# Patient Record
Sex: FEMALE | Race: WHITE | NOT HISPANIC OR LATINO | URBAN - METROPOLITAN AREA
[De-identification: names, ages, dates, MRNs, and addresses within clinical notes are randomized per-mention and may not be internally consistent; named-entity substitution may affect disease eponyms.]

---

## 2023-03-10 LAB — THYROTROPIN (MIU/L) IN SER/PLAS BY DETECTION LIMIT <= 0.05 MIU/L: 0.8 MIU/L (ref 0.44–3.98)

## 2023-03-22 DIAGNOSIS — E03.9 HYPOTHYROIDISM, UNSPECIFIED: ICD-10-CM

## 2023-03-22 DIAGNOSIS — E03.9 HYPOTHYROIDISM, UNSPECIFIED TYPE: Primary | ICD-10-CM

## 2023-03-22 RX ORDER — LEVOTHYROXINE SODIUM 100 UG/1
TABLET ORAL
Qty: 90 TABLET | Refills: 3 | Status: SHIPPED | OUTPATIENT
Start: 2023-03-22 | End: 2023-11-15 | Stop reason: SDUPTHER

## 2023-03-22 RX ORDER — ALBUTEROL SULFATE 90 UG/1
2 AEROSOL, METERED RESPIRATORY (INHALATION) EVERY 4 HOURS PRN
COMMUNITY
Start: 2021-04-05 | End: 2023-11-15 | Stop reason: SDUPTHER

## 2023-03-22 RX ORDER — LEVOTHYROXINE SODIUM 100 UG/1
100 TABLET ORAL
COMMUNITY
Start: 2022-12-29 | End: 2023-03-22 | Stop reason: SDUPTHER

## 2023-03-22 RX ORDER — SERTRALINE HYDROCHLORIDE 50 MG/1
50 TABLET, FILM COATED ORAL
COMMUNITY
Start: 2022-07-18 | End: 2023-11-15

## 2023-03-22 RX ORDER — LORATADINE 10 MG/1
1 TABLET ORAL DAILY
COMMUNITY
Start: 2020-10-02

## 2023-03-22 RX ORDER — LEVOTHYROXINE SODIUM 100 UG/1
100 TABLET ORAL
Qty: 90 TABLET | Refills: 3 | Status: SHIPPED | OUTPATIENT
Start: 2023-03-22 | End: 2024-02-24 | Stop reason: SDUPTHER

## 2023-04-03 ENCOUNTER — TELEPHONE (OUTPATIENT)
Dept: PRIMARY CARE | Facility: CLINIC | Age: 58
End: 2023-04-03
Payer: COMMERCIAL

## 2023-04-03 NOTE — TELEPHONE ENCOUNTER
----- Message from ALBINO Aldrich sent at 4/2/2023 11:10 PM EDT -----  Please let pt know that the thyroid labs look good.  Continue with current dose (100 mcg)  Does she need refills?  Please verify pharmacy

## 2023-04-03 NOTE — TELEPHONE ENCOUNTER
Jacob per HIPAA to leave a detailed message.  Left voicemail relaying message from provider  Requested patient call back if she needed refills and verify her pharmacy

## 2023-04-03 NOTE — RESULT ENCOUNTER NOTE
Please let pt know that the thyroid labs look good.  Continue with current dose (100 mcg)  Does she need refills?  Please verify pharmacy

## 2023-11-15 ENCOUNTER — OFFICE VISIT (OUTPATIENT)
Dept: PRIMARY CARE | Facility: CLINIC | Age: 58
End: 2023-11-15
Payer: COMMERCIAL

## 2023-11-15 VITALS
TEMPERATURE: 97.9 F | DIASTOLIC BLOOD PRESSURE: 100 MMHG | SYSTOLIC BLOOD PRESSURE: 142 MMHG | WEIGHT: 187.2 LBS | BODY MASS INDEX: 33.69 KG/M2

## 2023-11-15 DIAGNOSIS — R73.09 ELEVATED GLUCOSE: ICD-10-CM

## 2023-11-15 DIAGNOSIS — F32.A ANXIETY AND DEPRESSION: ICD-10-CM

## 2023-11-15 DIAGNOSIS — J45.21 MILD INTERMITTENT ASTHMA WITH EXACERBATION (HHS-HCC): ICD-10-CM

## 2023-11-15 DIAGNOSIS — F41.9 ANXIETY AND DEPRESSION: ICD-10-CM

## 2023-11-15 DIAGNOSIS — E03.9 ACQUIRED HYPOTHYROIDISM: Primary | ICD-10-CM

## 2023-11-15 PROBLEM — J45.909 ASTHMA (HHS-HCC): Status: ACTIVE | Noted: 2023-11-15

## 2023-11-15 PROBLEM — J30.2 SEASONAL ALLERGIES: Status: ACTIVE | Noted: 2023-11-15

## 2023-11-15 PROBLEM — R79.89 ELEVATED LIVER FUNCTION TESTS: Status: ACTIVE | Noted: 2023-11-15

## 2023-11-15 PROCEDURE — 1036F TOBACCO NON-USER: CPT | Performed by: FAMILY MEDICINE

## 2023-11-15 PROCEDURE — 99214 OFFICE O/P EST MOD 30 MIN: CPT | Performed by: FAMILY MEDICINE

## 2023-11-15 RX ORDER — ALBUTEROL SULFATE 90 UG/1
2 AEROSOL, METERED RESPIRATORY (INHALATION) EVERY 4 HOURS PRN
Qty: 18 G | Refills: 1 | Status: SHIPPED | OUTPATIENT
Start: 2023-11-15

## 2023-11-15 RX ORDER — MELOXICAM 15 MG/1
15 TABLET ORAL DAILY
COMMUNITY
End: 2024-03-28 | Stop reason: WASHOUT

## 2023-11-15 RX ORDER — PREDNISONE 20 MG/1
40 TABLET ORAL DAILY
Qty: 10 TABLET | Refills: 0 | Status: SHIPPED | OUTPATIENT
Start: 2023-11-15 | End: 2023-11-20

## 2023-11-15 RX ORDER — SERTRALINE HYDROCHLORIDE 50 MG/1
50 TABLET, FILM COATED ORAL DAILY
Qty: 30 TABLET | Refills: 1 | Status: SHIPPED | OUTPATIENT
Start: 2023-11-15 | End: 2023-12-08

## 2023-11-15 ASSESSMENT — ANXIETY QUESTIONNAIRES
7. FEELING AFRAID AS IF SOMETHING AWFUL MIGHT HAPPEN: SEVERAL DAYS
6. BECOMING EASILY ANNOYED OR IRRITABLE: MORE THAN HALF THE DAYS
4. TROUBLE RELAXING: SEVERAL DAYS
1. FEELING NERVOUS, ANXIOUS, OR ON EDGE: MORE THAN HALF THE DAYS
5. BEING SO RESTLESS THAT IT IS HARD TO SIT STILL: NOT AT ALL
3. WORRYING TOO MUCH ABOUT DIFFERENT THINGS: MORE THAN HALF THE DAYS
2. NOT BEING ABLE TO STOP OR CONTROL WORRYING: MORE THAN HALF THE DAYS
IF YOU CHECKED OFF ANY PROBLEMS ON THIS QUESTIONNAIRE, HOW DIFFICULT HAVE THESE PROBLEMS MADE IT FOR YOU TO DO YOUR WORK, TAKE CARE OF THINGS AT HOME, OR GET ALONG WITH OTHER PEOPLE: SOMEWHAT DIFFICULT
GAD7 TOTAL SCORE: 10

## 2023-11-15 ASSESSMENT — PATIENT HEALTH QUESTIONNAIRE - PHQ9
3. TROUBLE FALLING OR STAYING ASLEEP OR SLEEPING TOO MUCH: MORE THAN HALF THE DAYS
SUM OF ALL RESPONSES TO PHQ QUESTIONS 1-9: 13
7. TROUBLE CONCENTRATING ON THINGS, SUCH AS READING THE NEWSPAPER OR WATCHING TELEVISION: MORE THAN HALF THE DAYS
1. LITTLE INTEREST OR PLEASURE IN DOING THINGS: MORE THAN HALF THE DAYS
8. MOVING OR SPEAKING SO SLOWLY THAT OTHER PEOPLE COULD HAVE NOTICED. OR THE OPPOSITE, BEING SO FIGETY OR RESTLESS THAT YOU HAVE BEEN MOVING AROUND A LOT MORE THAN USUAL: NOT AT ALL
9. THOUGHTS THAT YOU WOULD BE BETTER OFF DEAD, OR OF HURTING YOURSELF: SEVERAL DAYS
10. IF YOU CHECKED OFF ANY PROBLEMS, HOW DIFFICULT HAVE THESE PROBLEMS MADE IT FOR YOU TO DO YOUR WORK, TAKE CARE OF THINGS AT HOME, OR GET ALONG WITH OTHER PEOPLE: SOMEWHAT DIFFICULT
6. FEELING BAD ABOUT YOURSELF - OR THAT YOU ARE A FAILURE OR HAVE LET YOURSELF OR YOUR FAMILY DOWN: NEARLY EVERY DAY
5. POOR APPETITE OR OVEREATING: NOT AT ALL
2. FEELING DOWN, DEPRESSED OR HOPELESS: MORE THAN HALF THE DAYS
4. FEELING TIRED OR HAVING LITTLE ENERGY: SEVERAL DAYS
SUM OF ALL RESPONSES TO PHQ9 QUESTIONS 1 AND 2: 4

## 2023-11-15 NOTE — PROGRESS NOTES
Subjective   Patient ID: Peace Santana is a 58 y.o. female who presents for Depression and Follow-up.    HPI   Cough and Congestion x 2 + weeks   No otcs meds now but did try muceneix   Fevers and fatigue at the begining   Still coughing quite a bit.  No chest pain, shortness of breath    Depression has been bad   Buspar didn't help much   Rxed zoloft   Wondering about other options  Lack of motivation  Some suicidal ideation but no plans  No HI  No hallucinations or delusions    Review of Systems  10 point review of systems negative except for what is noted in HPI  Objective   BP (!) 142/100   Temp 36.6 °C (97.9 °F)   Wt 84.9 kg (187 lb 3.2 oz)   BMI 33.69 kg/m²     Physical Exam  Vitals and nursing note reviewed.   Constitutional:       Appearance: Normal appearance. She is normal weight.   HENT:      Head: Normocephalic and atraumatic.      Right Ear: External ear normal.      Left Ear: External ear normal.      Mouth/Throat:      Mouth: Mucous membranes are moist.   Eyes:      Conjunctiva/sclera: Conjunctivae normal.   Cardiovascular:      Rate and Rhythm: Normal rate and regular rhythm.      Heart sounds: Normal heart sounds.   Pulmonary:      Effort: Pulmonary effort is normal.      Breath sounds: Rhonchi (Diffuse) present.   Musculoskeletal:         General: No swelling.      Cervical back: Normal range of motion and neck supple.   Skin:     General: Skin is warm and dry.      Capillary Refill: Capillary refill takes less than 2 seconds.   Neurological:      General: No focal deficit present.      Mental Status: She is alert. Mental status is at baseline.   Psychiatric:         Mood and Affect: Mood is depressed.         Speech: Speech normal.         Behavior: Behavior normal.         Thought Content: Thought content normal.         Judgment: Judgment normal.         Assessment/Plan   1. Acquired hypothyroidism  Update labs.  Follow-up tbd  - TSH with reflex to Free T4 if abnormal; Future    2. Anxiety and  depression  Discussed starting Zoloft and getting in to see counseling.  We will plan to see her back in 1 to 2 months unless concerns sooner  - sertraline (Zoloft) 50 mg tablet; Take 1 tablet (50 mg) by mouth once daily.  Dispense: 30 tablet; Refill: 1  - Referral to Psychology; Future    3. Mild intermittent asthma with exacerbation  Believe her asthma is flaring.  Treat as below.  Call if worse or not better  - albuterol 90 mcg/actuation inhaler; Inhale 2 puffs every 4 hours if needed for shortness of breath or wheezing.  Dispense: 18 g; Refill: 1  - CBC and Auto Differential; Future  - predniSONE (Deltasone) 20 mg tablet; Take 2 tablets (40 mg) by mouth once daily for 5 days.  Dispense: 10 tablet; Refill: 0    4. Elevated glucose  Labs due.  Follow-up to be determined  - Hemoglobin A1C; Future  - Lipid Panel; Future  - Comprehensive Metabolic Panel; Future      Tino Frances, DO

## 2023-12-07 ENCOUNTER — LAB (OUTPATIENT)
Dept: LAB | Facility: LAB | Age: 58
End: 2023-12-07
Payer: COMMERCIAL

## 2023-12-07 DIAGNOSIS — R73.09 ELEVATED GLUCOSE: ICD-10-CM

## 2023-12-07 DIAGNOSIS — F32.A ANXIETY AND DEPRESSION: ICD-10-CM

## 2023-12-07 DIAGNOSIS — F41.9 ANXIETY AND DEPRESSION: ICD-10-CM

## 2023-12-07 DIAGNOSIS — E03.9 ACQUIRED HYPOTHYROIDISM: ICD-10-CM

## 2023-12-07 DIAGNOSIS — J45.21 MILD INTERMITTENT ASTHMA WITH EXACERBATION (HHS-HCC): ICD-10-CM

## 2023-12-07 LAB
ALBUMIN SERPL BCP-MCNC: 4.2 G/DL (ref 3.4–5)
ALP SERPL-CCNC: 91 U/L (ref 33–110)
ALT SERPL W P-5'-P-CCNC: 44 U/L (ref 7–45)
ANION GAP SERPL CALC-SCNC: 9 MMOL/L (ref 10–20)
AST SERPL W P-5'-P-CCNC: 28 U/L (ref 9–39)
BASOPHILS # BLD AUTO: 0.07 X10*3/UL (ref 0–0.1)
BASOPHILS NFR BLD AUTO: 1.1 %
BILIRUB SERPL-MCNC: 0.8 MG/DL (ref 0–1.2)
BUN SERPL-MCNC: 10 MG/DL (ref 6–23)
CALCIUM SERPL-MCNC: 8.6 MG/DL (ref 8.6–10.3)
CHLORIDE SERPL-SCNC: 107 MMOL/L (ref 98–107)
CHOLEST SERPL-MCNC: 199 MG/DL (ref 0–199)
CHOLESTEROL/HDL RATIO: 4.4
CO2 SERPL-SCNC: 28 MMOL/L (ref 21–32)
CREAT SERPL-MCNC: 0.73 MG/DL (ref 0.5–1.05)
EOSINOPHIL # BLD AUTO: 0.38 X10*3/UL (ref 0–0.7)
EOSINOPHIL NFR BLD AUTO: 6.1 %
ERYTHROCYTE [DISTWIDTH] IN BLOOD BY AUTOMATED COUNT: 12.8 % (ref 11.5–14.5)
EST. AVERAGE GLUCOSE BLD GHB EST-MCNC: 117 MG/DL
GFR SERPL CREATININE-BSD FRML MDRD: >90 ML/MIN/1.73M*2
GLUCOSE SERPL-MCNC: 88 MG/DL (ref 74–99)
HBA1C MFR BLD: 5.7 %
HCT VFR BLD AUTO: 43.8 % (ref 36–46)
HDLC SERPL-MCNC: 45.5 MG/DL
HGB BLD-MCNC: 15.1 G/DL (ref 12–16)
IMM GRANULOCYTES # BLD AUTO: 0.01 X10*3/UL (ref 0–0.7)
IMM GRANULOCYTES NFR BLD AUTO: 0.2 % (ref 0–0.9)
LDLC SERPL CALC-MCNC: 125 MG/DL
LYMPHOCYTES # BLD AUTO: 1.99 X10*3/UL (ref 1.2–4.8)
LYMPHOCYTES NFR BLD AUTO: 32 %
MCH RBC QN AUTO: 32.5 PG (ref 26–34)
MCHC RBC AUTO-ENTMCNC: 34.5 G/DL (ref 32–36)
MCV RBC AUTO: 94 FL (ref 80–100)
MONOCYTES # BLD AUTO: 0.57 X10*3/UL (ref 0.1–1)
MONOCYTES NFR BLD AUTO: 9.2 %
NEUTROPHILS # BLD AUTO: 3.2 X10*3/UL (ref 1.2–7.7)
NEUTROPHILS NFR BLD AUTO: 51.4 %
NON HDL CHOLESTEROL: 154 MG/DL (ref 0–149)
NRBC BLD-RTO: 0 /100 WBCS (ref 0–0)
PLATELET # BLD AUTO: 256 X10*3/UL (ref 150–450)
POTASSIUM SERPL-SCNC: 4 MMOL/L (ref 3.5–5.3)
PROT SERPL-MCNC: 6.4 G/DL (ref 6.4–8.2)
RBC # BLD AUTO: 4.65 X10*6/UL (ref 4–5.2)
SODIUM SERPL-SCNC: 140 MMOL/L (ref 136–145)
TRIGL SERPL-MCNC: 144 MG/DL (ref 0–149)
TSH SERPL-ACNC: 0.45 MIU/L (ref 0.44–3.98)
VLDL: 29 MG/DL (ref 0–40)
WBC # BLD AUTO: 6.2 X10*3/UL (ref 4.4–11.3)

## 2023-12-07 PROCEDURE — 85025 COMPLETE CBC W/AUTO DIFF WBC: CPT

## 2023-12-07 PROCEDURE — 84443 ASSAY THYROID STIM HORMONE: CPT

## 2023-12-07 PROCEDURE — 80061 LIPID PANEL: CPT

## 2023-12-07 PROCEDURE — 83036 HEMOGLOBIN GLYCOSYLATED A1C: CPT

## 2023-12-07 PROCEDURE — 80053 COMPREHEN METABOLIC PANEL: CPT

## 2023-12-07 PROCEDURE — 36415 COLL VENOUS BLD VENIPUNCTURE: CPT

## 2023-12-08 RX ORDER — SERTRALINE HYDROCHLORIDE 50 MG/1
50 TABLET, FILM COATED ORAL DAILY
Qty: 90 TABLET | Refills: 1 | Status: SHIPPED | OUTPATIENT
Start: 2023-12-08 | End: 2024-03-28 | Stop reason: WASHOUT

## 2023-12-27 ENCOUNTER — APPOINTMENT (OUTPATIENT)
Dept: PRIMARY CARE | Facility: CLINIC | Age: 58
End: 2023-12-27
Payer: COMMERCIAL

## 2024-01-22 ENCOUNTER — APPOINTMENT (OUTPATIENT)
Dept: PHYSICAL THERAPY | Facility: CLINIC | Age: 59
End: 2024-01-22
Payer: COMMERCIAL

## 2024-01-22 ENCOUNTER — APPOINTMENT (OUTPATIENT)
Dept: PRIMARY CARE | Facility: CLINIC | Age: 59
End: 2024-01-22
Payer: COMMERCIAL

## 2024-02-24 DIAGNOSIS — E03.9 HYPOTHYROIDISM, UNSPECIFIED TYPE: ICD-10-CM

## 2024-02-24 RX ORDER — LEVOTHYROXINE SODIUM 100 UG/1
100 TABLET ORAL
Qty: 90 TABLET | Refills: 1 | Status: SHIPPED | OUTPATIENT
Start: 2024-02-24

## 2024-03-28 ENCOUNTER — OFFICE VISIT (OUTPATIENT)
Dept: PRIMARY CARE | Facility: CLINIC | Age: 59
End: 2024-03-28
Payer: COMMERCIAL

## 2024-03-28 ENCOUNTER — HOSPITAL ENCOUNTER (OUTPATIENT)
Dept: RADIOLOGY | Facility: CLINIC | Age: 59
Discharge: HOME | End: 2024-03-28
Payer: COMMERCIAL

## 2024-03-28 VITALS
DIASTOLIC BLOOD PRESSURE: 82 MMHG | TEMPERATURE: 97.8 F | WEIGHT: 189 LBS | SYSTOLIC BLOOD PRESSURE: 140 MMHG | HEIGHT: 63 IN | BODY MASS INDEX: 33.49 KG/M2

## 2024-03-28 DIAGNOSIS — R22.1 NECK MASS: Primary | ICD-10-CM

## 2024-03-28 DIAGNOSIS — R22.1 NECK MASS: ICD-10-CM

## 2024-03-28 PROCEDURE — 71046 X-RAY EXAM CHEST 2 VIEWS: CPT

## 2024-03-28 PROCEDURE — 99214 OFFICE O/P EST MOD 30 MIN: CPT | Performed by: FAMILY MEDICINE

## 2024-03-28 PROCEDURE — 71046 X-RAY EXAM CHEST 2 VIEWS: CPT | Performed by: STUDENT IN AN ORGANIZED HEALTH CARE EDUCATION/TRAINING PROGRAM

## 2024-03-28 RX ORDER — ACETAMINOPHEN 500 MG
2000 TABLET ORAL EVERY 24 HOURS
COMMUNITY

## 2024-03-28 RX ORDER — PETROLATUM,WHITE/LANOLIN
OINTMENT (GRAM) TOPICAL EVERY 8 HOURS
COMMUNITY
Start: 2024-01-04

## 2024-03-28 ASSESSMENT — PATIENT HEALTH QUESTIONNAIRE - PHQ9
1. LITTLE INTEREST OR PLEASURE IN DOING THINGS: NOT AT ALL
SUM OF ALL RESPONSES TO PHQ9 QUESTIONS 1 AND 2: 0
2. FEELING DOWN, DEPRESSED OR HOPELESS: NOT AT ALL

## 2024-03-28 NOTE — PROGRESS NOTES
"Subjective   Patient ID: Peace Santana is a 58 y.o. female who presents for Mass (Lump on left side of neck for the past few months that gets very sore ).    Patient presenting for lump on side of neck.  Has had lump on side of left neck that fluctuates in size. When it presents it hurts.     No fevers.   She does wake up hot, not drenching night sweats.     Menopause:  Did have hot flashes during menopause   No unintentinoal weight loss     Progesterone supplement; topical from naturopathic      Objective   /82   Temp 36.6 °C (97.8 °F)   Ht 1.588 m (5' 2.5\")   Wt 85.7 kg (189 lb)   BMI 34.02 kg/m²     Physical Exam  Constitutional:       Appearance: Normal appearance. She is normal weight.   HENT:      Head: Normocephalic and atraumatic.      Nose: Nose normal.      Mouth/Throat:      Mouth: Mucous membranes are moist.      Pharynx: Oropharynx is clear.   Eyes:      Extraocular Movements: Extraocular movements intact.      Conjunctiva/sclera: Conjunctivae normal.      Pupils: Pupils are equal, round, and reactive to light.   Pulmonary:      Effort: Pulmonary effort is normal.   Musculoskeletal:      Cervical back: Normal range of motion and neck supple. No rigidity or tenderness.   Lymphadenopathy:      Cervical: No cervical adenopathy.   Neurological:      General: No focal deficit present.      Mental Status: She is alert.   Psychiatric:         Mood and Affect: Mood normal.         Assessment/Plan   Diagnoses and all orders for this visit:  Neck mass  -     US head neck soft tissue; Future  -     XR chest 2 views; Future  Suzanne is a 58-year-old female presenting due to a fluctuating mass in her neck.  It is located at the left SC junction just superiorly.  It was not present today on exam but she did say it does fluctuate when it does, it is tender.  In addition she was found to have a lymph node deep behind the right breast on her mammogram which caused her to have a recall of the mammogram.  That " coupled with this potential left supraclavicular  lymphadenopathy I will get a chest x-ray to evaluate for any other inflamed lymph nodes in the chest cavity.  I did instruct her to get an ultrasound done of the area when it is inflamed again.  She will call to schedule the ultrasound when it does recur.      Ester Sainz DO     I spent a total of 16 minutes on the date of the service which included preparing to see the patient, face-to-face patient care, completing clinical documentation, performing a medically appropriate examination, counseling and educating the patient/family/caregiver and ordering medications, tests, or procedures.

## 2024-04-02 DIAGNOSIS — R93.89 ABNORMAL X-RAY: Primary | ICD-10-CM

## 2024-04-03 ENCOUNTER — PATIENT MESSAGE (OUTPATIENT)
Dept: PRIMARY CARE | Facility: CLINIC | Age: 59
End: 2024-04-03
Payer: COMMERCIAL

## 2024-04-17 ENCOUNTER — HOSPITAL ENCOUNTER (OUTPATIENT)
Dept: RADIOLOGY | Facility: CLINIC | Age: 59
Discharge: HOME | End: 2024-04-17
Payer: COMMERCIAL

## 2024-04-17 DIAGNOSIS — R93.89 ABNORMAL X-RAY: ICD-10-CM

## 2024-04-17 PROCEDURE — 71250 CT THORAX DX C-: CPT

## 2024-04-17 PROCEDURE — 71250 CT THORAX DX C-: CPT | Performed by: RADIOLOGY

## 2024-08-13 DIAGNOSIS — E03.9 HYPOTHYROIDISM, UNSPECIFIED TYPE: ICD-10-CM

## 2024-08-13 RX ORDER — LEVOTHYROXINE SODIUM 100 UG/1
TABLET ORAL
Qty: 90 TABLET | Refills: 0 | Status: SHIPPED | OUTPATIENT
Start: 2024-08-13

## 2024-10-09 ENCOUNTER — APPOINTMENT (OUTPATIENT)
Dept: PRIMARY CARE | Facility: CLINIC | Age: 59
End: 2024-10-09
Payer: COMMERCIAL

## 2024-10-09 VITALS
BODY MASS INDEX: 34.34 KG/M2 | WEIGHT: 186.6 LBS | OXYGEN SATURATION: 98 % | TEMPERATURE: 97.8 F | HEART RATE: 67 BPM | HEIGHT: 62 IN | SYSTOLIC BLOOD PRESSURE: 120 MMHG | DIASTOLIC BLOOD PRESSURE: 80 MMHG

## 2024-10-09 DIAGNOSIS — E03.9 ACQUIRED HYPOTHYROIDISM: ICD-10-CM

## 2024-10-09 DIAGNOSIS — Z01.818 PRE-OP EVALUATION: ICD-10-CM

## 2024-10-09 DIAGNOSIS — R79.89 ELEVATED LIVER FUNCTION TESTS: ICD-10-CM

## 2024-10-09 DIAGNOSIS — M17.12 PRIMARY OSTEOARTHRITIS OF LEFT KNEE: Primary | ICD-10-CM

## 2024-10-09 PROCEDURE — 99214 OFFICE O/P EST MOD 30 MIN: CPT | Performed by: FAMILY MEDICINE

## 2024-10-09 PROCEDURE — 1036F TOBACCO NON-USER: CPT | Performed by: FAMILY MEDICINE

## 2024-10-09 PROCEDURE — 3008F BODY MASS INDEX DOCD: CPT | Performed by: FAMILY MEDICINE

## 2024-10-09 RX ORDER — APREPITANT 40 MG/1
CAPSULE ORAL EVERY 24 HOURS
COMMUNITY

## 2024-10-09 ASSESSMENT — ENCOUNTER SYMPTOMS
DIARRHEA: 0
NERVOUS/ANXIOUS: 0
DYSURIA: 0
NAUSEA: 0
SINUS PRESSURE: 0
JOINT SWELLING: 0
WOUND: 0
BLOOD IN STOOL: 0
COUGH: 0
ARTHRALGIAS: 0
DIZZINESS: 0
PALPITATIONS: 0
APPETITE CHANGE: 0
SHORTNESS OF BREATH: 0
SLEEP DISTURBANCE: 0
ABDOMINAL PAIN: 0
VOMITING: 0
DYSPHORIC MOOD: 0
EYE PAIN: 0
HEADACHES: 0
FATIGUE: 0
CONSTIPATION: 0
ACTIVITY CHANGE: 0
LIGHT-HEADEDNESS: 0

## 2024-10-09 ASSESSMENT — PATIENT HEALTH QUESTIONNAIRE - PHQ9
SUM OF ALL RESPONSES TO PHQ9 QUESTIONS 1 AND 2: 0
2. FEELING DOWN, DEPRESSED OR HOPELESS: NOT AT ALL
1. LITTLE INTEREST OR PLEASURE IN DOING THINGS: NOT AT ALL

## 2024-10-09 NOTE — PROGRESS NOTES
"Subjective   Peace Santana is a 58 y.o. female who presents to the office today for a preoperative consultation at the request of surgeon Dr. Sheppard  who plans on performing left knee replacement  on October 22. This consultation is requested for preoperative evaluation. Planned anesthesia: spinal. The patient has the following known anesthesia issues:  none . Patients bleeding risk: no recent abnormal bleeding, no remote history of abnormal bleeding, and no use of Ca-channel blockers. Patient does not have objections to receiving blood products if needed.    Has asthma but very rare albuterol use. No controller med No recent sterid use  No REGULO  Non smoker  5 alcoholic beverages per week   No history of CAD, DM 2, TIA or stroke, blood clotting or clot disorder  Review of Systems   Constitutional:  Negative for activity change, appetite change and fatigue.   HENT:  Negative for congestion, postnasal drip and sinus pressure.    Eyes:  Negative for pain and visual disturbance.   Respiratory:  Negative for cough and shortness of breath.    Cardiovascular:  Negative for chest pain, palpitations and leg swelling.   Gastrointestinal:  Negative for abdominal pain, blood in stool, constipation, diarrhea, nausea and vomiting.   Endocrine: Negative for cold intolerance and heat intolerance.   Genitourinary:  Negative for dysuria and menstrual problem.   Musculoskeletal:  Negative for arthralgias and joint swelling.   Skin:  Negative for rash and wound.   Neurological:  Negative for dizziness, light-headedness and headaches.   Psychiatric/Behavioral:  Negative for dysphoric mood and sleep disturbance. The patient is not nervous/anxious.        Objective     Visit Vitals  /80   Pulse 67   Temp 36.6 °C (97.8 °F)   Ht 1.575 m (5' 2\")   Wt 84.6 kg (186 lb 9.6 oz)   SpO2 98%   BMI 34.13 kg/m²   Smoking Status Never   BSA 1.92 m²      Physical Exam  Vitals and nursing note reviewed.   Constitutional:       Appearance: Normal " appearance.   HENT:      Head: Normocephalic and atraumatic.      Right Ear: Tympanic membrane, ear canal and external ear normal.      Left Ear: Tympanic membrane, ear canal and external ear normal.      Nose: Nose normal.      Mouth/Throat:      Mouth: Mucous membranes are moist.      Pharynx: Oropharynx is clear.   Eyes:      Extraocular Movements: Extraocular movements intact.      Conjunctiva/sclera: Conjunctivae normal.      Pupils: Pupils are equal, round, and reactive to light.   Cardiovascular:      Rate and Rhythm: Normal rate and regular rhythm.      Pulses: Normal pulses.      Heart sounds: Normal heart sounds. No murmur heard.     No friction rub. No gallop.   Pulmonary:      Effort: Pulmonary effort is normal. No respiratory distress.      Breath sounds: Normal breath sounds.   Abdominal:      General: Abdomen is flat. Bowel sounds are normal.      Palpations: Abdomen is soft. There is no mass.      Tenderness: There is no abdominal tenderness. There is no rebound.   Musculoskeletal:         General: No swelling or deformity. Normal range of motion.      Cervical back: Normal range of motion and neck supple.   Lymphadenopathy:      Cervical: No cervical adenopathy.   Skin:     General: Skin is warm and dry.      Capillary Refill: Capillary refill takes less than 2 seconds.      Findings: No rash.   Neurological:      General: No focal deficit present.      Mental Status: She is alert and oriented to person, place, and time. Mental status is at baseline.      Cranial Nerves: No cranial nerve deficit.      Gait: Gait normal.      Deep Tendon Reflexes: Reflexes normal.   Psychiatric:         Mood and Affect: Mood normal.         Behavior: Behavior normal.         Thought Content: Thought content normal.         Judgment: Judgment normal.          Predictors of intubation difficulty:   Morbid obesity? no   Anatomically abnormal facies? no   Prominent incisors? no   Receding mandible? no   Short, thick  neck? no   Neck range of motion: normal   Mallampati score: III (soft and hard palate and base of uvula visible)   Dentition: No chipped, loose, or missing teeth.    Cardiographics  ECG: normal sinus rhythm, no blocks or conduction defects, no ischemic changes  Echocardiogram: not done    Imaging  Chest x-ray: normal     Lab Review   not applicable  All reveiwed- only alk phos elevated no pre op concern  .    Assessment/Plan   58 y.o. female with planned surgery as above.    Known risk factors for perioperative complications: None    Difficulty with intubation is not anticipated.    Cardiac Risk Estimation: RCRI score 0    Current medications which may produce withdrawal symptoms if withheld perioperatively: none    1. Preoperative workup as follows none.  2. Change in medication regimen before surgery: none, continue medication regimen including morning of surgery, with sip of water.  3. Prophylaxis for cardiac events with perioperative beta-blockers: not indicated.  4. Invasive hemodynamic monitoring perioperatively: not indicated.  5. Deep vein thrombosis prophylaxis postoperatively:regimen to be chosen by surgical team.  6. Surveillance for postoperative MI with ECG immediately postoperatively and on postoperative days 1 and 2 AND troponin levels 24 hours postoperatively and on day 4 or hospital discharge (whichever comes first): not indicated.    Cleared for surgery without concerns    Labs in 2 months for recheck alk phos as well as thyroid and lipids

## 2024-11-11 DIAGNOSIS — E03.9 HYPOTHYROIDISM, UNSPECIFIED TYPE: ICD-10-CM

## 2024-11-11 RX ORDER — LEVOTHYROXINE SODIUM 100 UG/1
TABLET ORAL
Qty: 90 TABLET | Refills: 0 | Status: SHIPPED | OUTPATIENT
Start: 2024-11-11

## 2025-02-10 DIAGNOSIS — E03.9 HYPOTHYROIDISM, UNSPECIFIED TYPE: ICD-10-CM

## 2025-02-10 RX ORDER — LEVOTHYROXINE SODIUM 100 UG/1
TABLET ORAL
Qty: 90 TABLET | Refills: 0 | Status: SHIPPED | OUTPATIENT
Start: 2025-02-10

## 2025-03-12 DIAGNOSIS — Z71.85 VACCINE COUNSELING: Primary | ICD-10-CM

## 2025-03-14 LAB — MEV IGG SER IA-ACNC: 65 AU/ML

## 2025-03-24 ENCOUNTER — TELEPHONE (OUTPATIENT)
Dept: PRIMARY CARE | Facility: CLINIC | Age: 60
End: 2025-03-24
Payer: COMMERCIAL

## 2025-03-24 NOTE — TELEPHONE ENCOUNTER
"[employer] states the diagnosis of Z71.85 is not a covered diagnosis for the Measles AB IGG performed 03/13/25.    Please advise if another if there is a different diagnosis to try or should response of \"No Other Diagnosis Available\" be sent to [facility].    Thank you  "

## 2025-04-16 ENCOUNTER — OFFICE VISIT (OUTPATIENT)
Dept: PRIMARY CARE | Facility: CLINIC | Age: 60
End: 2025-04-16
Payer: COMMERCIAL

## 2025-04-16 VITALS
HEART RATE: 79 BPM | TEMPERATURE: 97.5 F | BODY MASS INDEX: 34.48 KG/M2 | HEIGHT: 62 IN | SYSTOLIC BLOOD PRESSURE: 130 MMHG | DIASTOLIC BLOOD PRESSURE: 90 MMHG | OXYGEN SATURATION: 98 % | WEIGHT: 187.4 LBS

## 2025-04-16 DIAGNOSIS — R73.09 ELEVATED GLUCOSE: ICD-10-CM

## 2025-04-16 DIAGNOSIS — Z88.0 PENICILLIN ALLERGY: ICD-10-CM

## 2025-04-16 DIAGNOSIS — E03.9 ACQUIRED HYPOTHYROIDISM: Primary | ICD-10-CM

## 2025-04-16 DIAGNOSIS — Z00.00 ROUTINE GENERAL MEDICAL EXAMINATION AT A HEALTH CARE FACILITY: ICD-10-CM

## 2025-04-16 DIAGNOSIS — R79.89 ELEVATED LIVER FUNCTION TESTS: ICD-10-CM

## 2025-04-16 PROCEDURE — 3008F BODY MASS INDEX DOCD: CPT | Performed by: FAMILY MEDICINE

## 2025-04-16 PROCEDURE — 99396 PREV VISIT EST AGE 40-64: CPT | Performed by: FAMILY MEDICINE

## 2025-04-16 ASSESSMENT — PATIENT HEALTH QUESTIONNAIRE - PHQ9
2. FEELING DOWN, DEPRESSED OR HOPELESS: NOT AT ALL
SUM OF ALL RESPONSES TO PHQ9 QUESTIONS 1 AND 2: 0
1. LITTLE INTEREST OR PLEASURE IN DOING THINGS: NOT AT ALL

## 2025-04-16 NOTE — PATIENT INSTRUCTIONS
Omron blood pressure cuff     I recommend Prevnar 20, RSV vaccine and Shingrix at the pharmacy.    Recommendations for women annual wellness exam:  Make sure screenings for cervical, breast, and colon cancer are up to date if applicable- pap smears age 21-65, discuss mammogram starting at age 40, colonoscopy at age 45, earlier if positive family history for breast or colon cancer  Screen for osteoporosis with DXA bone scan starting at age 65 or sooner if risk factors present (long term steroid use, smoking, heavy alcohol use, history of fracture, rheumatoid arthritis, low body weight, family history of hip fracture)  Screening for lung cancer with low-dose CT in all adults age 55-77 who have a 30 pack-year smoking history and currently smoke or have quit within the past 15 years  Follow a healthy diet (Dash diet, Mediterranean diet)  Exercise 150 min/wk  Maintain healthy weight (BMI < 25)  Do not smoke  Alcohol in moderation (up to 1 drink/day)  Get enough sleep (7-8 hours/night)  Make sure immunizations are up to date (influenza, pneumococcal, Tdap, shingles if age > 50)  Postmenopausal women or women with osteoporosis need 600- 1,200 mg calcium and 0919-4451 IU vitamin D daily  Talk to your physician if you have concerns about depression or anxiety  Visit dentist twice yearly

## 2025-04-16 NOTE — PROGRESS NOTES
"Subjective   Patient ID: Peace Santana is a 59 y.o. female who presents for Annual Exam.    HPI   GYN- [facility]       Review of Systems    Objective   /90   Pulse 79   Temp 36.4 °C (97.5 °F)   Ht 1.581 m (5' 2.25\")   Wt 85 kg (187 lb 6.4 oz)   SpO2 98%   BMI 34.00 kg/m²     Physical Exam    Assessment/Plan   There are no diagnoses linked to this encounter.     Tino Frances, DO  " Oropharynx is clear.   Eyes:      Extraocular Movements: Extraocular movements intact.      Conjunctiva/sclera: Conjunctivae normal.      Pupils: Pupils are equal, round, and reactive to light.   Cardiovascular:      Rate and Rhythm: Normal rate and regular rhythm.      Pulses: Normal pulses.      Heart sounds: Normal heart sounds. No murmur heard.     No friction rub. No gallop.   Pulmonary:      Effort: Pulmonary effort is normal. No respiratory distress.      Breath sounds: Normal breath sounds.   Abdominal:      General: Abdomen is flat. Bowel sounds are normal.      Palpations: Abdomen is soft. There is no mass.      Tenderness: There is no abdominal tenderness. There is no rebound.   Musculoskeletal:         General: No swelling or deformity. Normal range of motion.      Cervical back: Normal range of motion and neck supple.   Lymphadenopathy:      Cervical: No cervical adenopathy.   Skin:     General: Skin is warm and dry.      Capillary Refill: Capillary refill takes less than 2 seconds.      Findings: No rash.   Neurological:      General: No focal deficit present.      Mental Status: She is alert and oriented to person, place, and time. Mental status is at baseline.      Cranial Nerves: No cranial nerve deficit.      Gait: Gait normal.      Deep Tendon Reflexes: Reflexes normal.   Psychiatric:         Mood and Affect: Mood normal.         Behavior: Behavior normal.         Thought Content: Thought content normal.         Judgment: Judgment normal.         Assessment/Plan   1. Acquired hypothyroidism  TSH with reflex to Free T4 if abnormal    TSH with reflex to Free T4 if abnormal      2. Elevated glucose  Hemoglobin A1C    Hemoglobin A1C      3. Elevated liver function tests        4. Routine general medical examination at a health care facility  TSH with reflex to Free T4 if abnormal    Hemoglobin A1C    CBC and Auto Differential    Lipid Panel    Comprehensive Metabolic Panel    TSH with reflex to Free  T4 if abnormal    Hemoglobin A1C    CBC and Auto Differential    Lipid Panel    Comprehensive Metabolic Panel      5. Penicillin allergy  Referral to Allergy        Update orders as above  Discussed improvements needed in diet and activity  Continue with OB/GYN  Colonoscopy done 2020 next due 2030  Referral to allergy to discuss penicillin allergy testing  Appropriate vaccines discussed    Tino Frances DO

## 2025-04-22 ASSESSMENT — ENCOUNTER SYMPTOMS
HEADACHES: 0
LIGHT-HEADEDNESS: 0
NERVOUS/ANXIOUS: 0
NAUSEA: 0
DYSPHORIC MOOD: 0
APPETITE CHANGE: 0
SLEEP DISTURBANCE: 0
DIARRHEA: 0
VOMITING: 0
FATIGUE: 0
SINUS PRESSURE: 0
DIZZINESS: 0
COUGH: 0
EYE PAIN: 0
SHORTNESS OF BREATH: 0
ABDOMINAL PAIN: 0
JOINT SWELLING: 0
BLOOD IN STOOL: 0
ARTHRALGIAS: 0
CONSTIPATION: 0
PALPITATIONS: 0
DYSURIA: 0
ACTIVITY CHANGE: 0
WOUND: 0

## 2025-04-25 LAB
ALBUMIN SERPL-MCNC: 4.7 G/DL (ref 3.6–5.1)
ALP SERPL-CCNC: 102 U/L (ref 37–153)
ALT SERPL-CCNC: 31 U/L (ref 6–29)
ANION GAP SERPL CALCULATED.4IONS-SCNC: 9 MMOL/L (CALC) (ref 7–17)
AST SERPL-CCNC: 25 U/L (ref 10–35)
BASOPHILS # BLD AUTO: 62 CELLS/UL (ref 0–200)
BASOPHILS NFR BLD AUTO: 1 %
BILIRUB SERPL-MCNC: 0.9 MG/DL (ref 0.2–1.2)
BUN SERPL-MCNC: 16 MG/DL (ref 7–25)
CALCIUM SERPL-MCNC: 9.1 MG/DL (ref 8.6–10.4)
CHLORIDE SERPL-SCNC: 103 MMOL/L (ref 98–110)
CHOLEST SERPL-MCNC: 209 MG/DL
CHOLEST/HDLC SERPL: 4.4 (CALC)
CO2 SERPL-SCNC: 28 MMOL/L (ref 20–32)
CREAT SERPL-MCNC: 0.71 MG/DL (ref 0.5–1.05)
EGFRCR SERPLBLD CKD-EPI 2021: 97 ML/MIN/1.73M2
EOSINOPHIL # BLD AUTO: 285 CELLS/UL (ref 15–500)
EOSINOPHIL NFR BLD AUTO: 4.6 %
ERYTHROCYTE [DISTWIDTH] IN BLOOD BY AUTOMATED COUNT: 12.8 % (ref 11–15)
EST. AVERAGE GLUCOSE BLD GHB EST-MCNC: 114 MG/DL
EST. AVERAGE GLUCOSE BLD GHB EST-SCNC: 6.3 MMOL/L
GLUCOSE SERPL-MCNC: 87 MG/DL (ref 65–99)
HBA1C MFR BLD: 5.6 %
HCT VFR BLD AUTO: 44.4 % (ref 35–45)
HDLC SERPL-MCNC: 48 MG/DL
HGB BLD-MCNC: 14.9 G/DL (ref 11.7–15.5)
LDLC SERPL CALC-MCNC: 134 MG/DL (CALC)
LYMPHOCYTES # BLD AUTO: 1866 CELLS/UL (ref 850–3900)
LYMPHOCYTES NFR BLD AUTO: 30.1 %
MCH RBC QN AUTO: 32.3 PG (ref 27–33)
MCHC RBC AUTO-ENTMCNC: 33.6 G/DL (ref 32–36)
MCV RBC AUTO: 96.3 FL (ref 80–100)
MONOCYTES # BLD AUTO: 546 CELLS/UL (ref 200–950)
MONOCYTES NFR BLD AUTO: 8.8 %
NEUTROPHILS # BLD AUTO: 3441 CELLS/UL (ref 1500–7800)
NEUTROPHILS NFR BLD AUTO: 55.5 %
NONHDLC SERPL-MCNC: 161 MG/DL (CALC)
PLATELET # BLD AUTO: 279 THOUSAND/UL (ref 140–400)
PMV BLD REES-ECKER: 10.8 FL (ref 7.5–12.5)
POTASSIUM SERPL-SCNC: 3.8 MMOL/L (ref 3.5–5.3)
PROT SERPL-MCNC: 6.8 G/DL (ref 6.1–8.1)
RBC # BLD AUTO: 4.61 MILLION/UL (ref 3.8–5.1)
SODIUM SERPL-SCNC: 140 MMOL/L (ref 135–146)
TRIGL SERPL-MCNC: 141 MG/DL
TSH SERPL-ACNC: 2.82 MIU/L (ref 0.4–4.5)
WBC # BLD AUTO: 6.2 THOUSAND/UL (ref 3.8–10.8)

## 2025-05-12 DIAGNOSIS — E03.9 HYPOTHYROIDISM, UNSPECIFIED TYPE: ICD-10-CM

## 2025-05-12 RX ORDER — LEVOTHYROXINE SODIUM 100 UG/1
TABLET ORAL
Qty: 90 TABLET | Refills: 3 | Status: SHIPPED | OUTPATIENT
Start: 2025-05-12

## 2025-06-04 ENCOUNTER — APPOINTMENT (OUTPATIENT)
Dept: PRIMARY CARE | Facility: CLINIC | Age: 60
End: 2025-06-04
Payer: COMMERCIAL

## 2025-07-28 ENCOUNTER — APPOINTMENT (OUTPATIENT)
Dept: ALLERGY | Facility: CLINIC | Age: 60
End: 2025-07-28
Payer: COMMERCIAL

## 2025-07-28 VITALS
HEIGHT: 62 IN | RESPIRATION RATE: 18 BRPM | DIASTOLIC BLOOD PRESSURE: 80 MMHG | HEART RATE: 75 BPM | OXYGEN SATURATION: 98 % | WEIGHT: 179 LBS | TEMPERATURE: 97.8 F | SYSTOLIC BLOOD PRESSURE: 124 MMHG | BODY MASS INDEX: 32.94 KG/M2

## 2025-07-28 DIAGNOSIS — Z88.0 PENICILLIN ALLERGY: Primary | ICD-10-CM

## 2025-07-28 DIAGNOSIS — J30.1 SEASONAL ALLERGIC RHINITIS DUE TO POLLEN: ICD-10-CM

## 2025-07-28 PROCEDURE — 99204 OFFICE O/P NEW MOD 45 MIN: CPT | Performed by: ALLERGY & IMMUNOLOGY

## 2025-07-28 NOTE — PATIENT INSTRUCTIONS
Go off Claritin or any other antihistamine 5 days prior to skin tests  Call office to schedule.      Thursday in [facility].  Call to schedule [phone]

## 2025-07-28 NOTE — PROGRESS NOTES
Patient ID: Peace Santana is a 59 y.o. female.     Chief Complaint: NPV referred by Dr. Frances  History Of Present Illness  Peace Santana is a 59 y.o. female with PMx drug allergy , AR and asthma presenting for consultation.     History of hives with PCN  She recalls several courses of antibiotics for strep throat until she had her tonsils out.  Developed diffuse hives without anaphylaxis.  Has avoided but has become an issues. Had knee replacement last year and her orthopod wants her to do prophylaxis with dental procedures      Food Allergy  No    Eczema/ Atopic Dermatitis    Sometimes does have have itchy spots on her hands after sun exposure in the summer.  Uses anti itch, but no prescription med.  Asthma  Dx as adult after pregnancy  Current medication: as needed rescue inhaler  Hospitalizations/ER visits in the last year: none  Oral steroid/systemic steroids in the last year: when she had the flu in February, took steroid  Triggers: illness trigger, fall mold, sometime exercise, cold is the biggest trigger  Smoker or Smoke exposure: none  Family history:  Family History[1]    MGM has asthma  Rhinoconjunctivitis  Mold reported from blood screen many years ago  Claritin daily    Drug Allergy   PCN-hives    Insect Allergy   No    Infections  No history of frequent or recurrent infections      Review of Systems    Pertinent positives and negatives have been assessed in the HPI. All other systems have been reviewed and are negative except as noted in the HPI.    Allergies  Penicillins    Past Medical History  She has a past medical history of Allergic rhinitis (1980), Asthma (2001), Bell's palsy, and Food intolerance (2015).    Family History  Family History[2]      Surgical History  She has a past surgical history that includes Other surgical history (03/04/2020); Other surgical history (03/04/2020); Other surgical history (03/04/2020); Total knee arthroplasty (Left, 2024); and Tonsillectomy (1982).  Knee replaced  "last October.  Her surgeon wants her to take pre procedure prophylaxis.    Social/Environmental History  She reports that she has never smoked. She has never used smokeless tobacco. She reports current alcohol use of about 5.0 standard drinks of alcohol per week. She reports that she does not use drugs.    Home: Lives in a house   Floors: mixed  Air Conditioning: Central  Smoker: No  Pets: dog  Infestations: No  Molds: No  Occupation: [employer]     MEDICATIONS  Medications Ordered Prior to Encounter[3]      Physical Exam  Visit Vitals  /80   Pulse 75   Temp 36.6 °C (97.8 °F)   Resp 18   Ht 1.575 m (5' 2\")   Wt 81.2 kg (179 lb)   SpO2 98%   BMI 32.74 kg/m²   Smoking Status Never   BSA 1.88 m²       Wt Readings from Last 1 Encounters:   07/28/25 81.2 kg (179 lb)       Physical Exam    General: Well appearing, no acute distress  Head: Normocephalic, atraumatic, neck supple without lymphadenopathy  Eyes: EOMI, non-injected  Nose: No nasal crease, nares patent, slightly boggy turbinates, minimal discharge  Throat: Normal dentition, no erythema  Heart: Regular rate and rhythm  Lungs: Clear to auscultation bilaterally, effort normal  Abdomen: Soft, non-tender, normal bowel sounds  Extremities: Moves all extremities symmetrically, no edema  Skin: No rashes/lesions. Left healing scar.  Psych: normal mood and affect    LAB RESULTS:  CBC:  Recent Labs     04/24/25  0826 12/07/23  0817 05/28/20  0835   WBC 6.2 6.2 6.4   HGB 14.9 15.1 14.4   HCT 44.4 43.8 41.2    256 266   MCV 96.3 94 92   EOSABS 285 0.38  --        CMP:  Recent Labs     04/24/25  0826 12/07/23  0817 10/10/22  0833    140 141   K 3.8 4.0 4.4    107 107   CO2 28 28 29   ANIONGAP 9 9* 9*   BUN 16 10 12   CREATININE 0.71 0.73 0.72   EGFR 97 >90  --      Recent Labs     04/24/25  0826 12/07/23  0817 10/10/22  0833   ALBUMIN 4.7 4.2 4.5   ALKPHOS 102 91 96   ALT 31* 44 40   AST 25 28 30   BILITOT 0.9 0.8 0.7 "       Recent Labs     04/24/25  0826 12/07/23  0817   EOSABS 285 0.38         HEME/ENDO:  Recent Labs     04/24/25  0826 12/07/23  0817 03/10/23  0849 12/22/22  1215   TSH 2.82 0.45 0.80 0.27*   HGBA1C 5.6 5.7*  --  5.6         Assessment/Plan     Peace is a 58 yo with a history of rhinitis, intermittent asthma and penicillin allergy.  We discussed follow up for testing to these issues.  We discussed that we will do these tests at separate appointments and when she feels she can be off of antihistamine for 5 days prior to test.  Iram Villagomez DO          [1]   Family History  Problem Relation Name Age of Onset    Uterine cancer Mother     [2]   Family History  Problem Relation Name Age of Onset    Uterine cancer Mother     [3]   Current Outpatient Medications on File Prior to Visit   Medication Sig Dispense Refill    albuterol 90 mcg/actuation inhaler Inhale 2 puffs every 4 hours if needed for shortness of breath or wheezing. 18 g 1    cholecalciferol (Vitamin D-3) 50 mcg (2,000 unit) capsule Take 1 capsule (2,000 Units) by mouth once every 24 hours.      levothyroxine (Synthroid, Levoxyl) 100 mcg tablet TAKE 1 TABLET DAILY IN THE MORNING BEFORE A MEAL ON AN EMPTY STOMACH 90 tablet 3    loratadine (Claritin) 10 mg tablet Take 1 tablet (10 mg) by mouth once daily.       No current facility-administered medications on file prior to visit.

## 2025-09-05 ENCOUNTER — HOSPITAL ENCOUNTER (OUTPATIENT)
Dept: RADIOLOGY | Facility: CLINIC | Age: 60
Discharge: HOME | End: 2025-09-05
Payer: COMMERCIAL

## 2025-09-05 DIAGNOSIS — E78.00 PURE HYPERCHOLESTEROLEMIA: ICD-10-CM

## 2025-09-05 PROCEDURE — 75571 CT HRT W/O DYE W/CA TEST: CPT

## 2025-10-30 ENCOUNTER — APPOINTMENT (OUTPATIENT)
Dept: ALLERGY | Facility: CLINIC | Age: 60
End: 2025-10-30
Payer: COMMERCIAL